# Patient Record
Sex: MALE | Race: ASIAN | NOT HISPANIC OR LATINO | URBAN - METROPOLITAN AREA
[De-identification: names, ages, dates, MRNs, and addresses within clinical notes are randomized per-mention and may not be internally consistent; named-entity substitution may affect disease eponyms.]

---

## 2017-04-08 ENCOUNTER — EMERGENCY (EMERGENCY)
Facility: HOSPITAL | Age: 32
LOS: 1 days | Discharge: HOME | End: 2017-04-08
Attending: EMERGENCY MEDICINE | Admitting: EMERGENCY MEDICINE
Payer: COMMERCIAL

## 2017-04-08 VITALS
RESPIRATION RATE: 17 BRPM | SYSTOLIC BLOOD PRESSURE: 126 MMHG | DIASTOLIC BLOOD PRESSURE: 92 MMHG | HEIGHT: 71 IN | TEMPERATURE: 98 F | OXYGEN SATURATION: 97 % | HEART RATE: 73 BPM | WEIGHT: 162.92 LBS

## 2017-04-08 DIAGNOSIS — Z79.899 OTHER LONG TERM (CURRENT) DRUG THERAPY: ICD-10-CM

## 2017-04-08 DIAGNOSIS — M25.531 PAIN IN RIGHT WRIST: ICD-10-CM

## 2017-04-08 DIAGNOSIS — M79.641 PAIN IN RIGHT HAND: ICD-10-CM

## 2017-04-08 PROCEDURE — 73130 X-RAY EXAM OF HAND: CPT | Mod: 26,RT

## 2017-04-08 PROCEDURE — 99283 EMERGENCY DEPT VISIT LOW MDM: CPT

## 2017-04-08 PROCEDURE — 73110 X-RAY EXAM OF WRIST: CPT | Mod: 26,RT

## 2017-04-08 RX ORDER — IBUPROFEN 200 MG
600 TABLET ORAL ONCE
Qty: 0 | Refills: 0 | Status: DISCONTINUED | OUTPATIENT
Start: 2017-04-08 | End: 2017-04-12

## 2017-04-08 RX ORDER — ALBUTEROL 90 UG/1
2 AEROSOL, METERED ORAL
Qty: 0 | Refills: 0 | COMMUNITY

## 2017-04-08 NOTE — ED PROVIDER NOTE - DIAGNOSTIC INTERPRETATION
xray R hand: +soft tissue swelling. no acute fx or dislocation, joint space intact, no effusion noted   xray R wrist: +soft tissue swelling. no acute fx or dislocation, joint space intact, no effusion noted

## 2017-04-08 NOTE — ED PROVIDER NOTE - MEDICAL DECISION MAKING DETAILS
wrist and hand pain after punching wall, possible sprain, will get xray to r/o fracture, not concerned for neurovascular injury, nsaids for pain

## 2017-04-08 NOTE — ED ADULT NURSE NOTE - TOBACCO USE
Pt was BIBA for or overdose.On initial assessment Pt was found the room unresponsive in respiratory distress. MD was called and a code was called. See codesheet
Current some day smoker

## 2017-04-08 NOTE — ED PROVIDER NOTE - MUSCULOSKELETAL, MLM
Spine appears normal, range of motion is not limited, +ttp over scaphoid, dorsal wrist and 4th metacarpal, no deformity, mild wrist swelling, good cap refill

## 2017-04-08 NOTE — ED ADULT NURSE NOTE - NS ED PATIENT SAFETY CONCERN
No no loss of consciousness, no gait abnormality, no headache, no sensory deficits, and no weakness.

## 2017-04-08 NOTE — ED PROVIDER NOTE - PROGRESS NOTE DETAILS
no fracture, still concerned for occult scaphoid fracture, placed in pre-gianni thumb spica splint, nsaids for pain, return precautions, follow up with Dr Boyce

## 2024-12-11 NOTE — ED PROVIDER NOTE - NORMAL, MLM
New Patient    What is the reason for the patient’s appointment?: Patient's wife called stating he is having pain on his groin area.  It hurts when he coughs.  Its been about a week.  She stated it could be a hernia.  She wanted to schedule with Dr. Dillon.  Offered appointment with [facility] in [place] but patient was in the background and stated he only wanted to see Dr. Dillon.  No appointments available with Dr. Dillon as a new pt until February.  He stated he will call his pcp and call the office back.  No appointment scheduled.     What office location does the patient prefer?:    Does patient have Imaging/Lab Results:    Have patient records been requested?:  If No, are the records showing in Epic:       INSURANCE:   Do we accept the patient's insurance or is the patient Self-Pay?:    Insurance Provider:  Plan Type/Number:   Member ID#:       HISTORY:   Has the patient had any previous Urologist(s)?:    Was the patient seen in the ED?:    Has the patient had any outside testing done?:    Does the patient have a personal history of cancer?:          jeff all pertinent systems normal